# Patient Record
(demographics unavailable — no encounter records)

---

## 2025-02-18 NOTE — HEALTH RISK ASSESSMENT
[Yes] : Yes [2 - 4 times a month (2 pts)] : 2-4 times a month (2 points) [1 or 2 (0 pts)] : 1 or 2 (0 points) [Less than monthly (1 pt)] : Less than monthly (1 point) [0] : 1) Little interest or pleasure doing things: Not at all (0) [1] : 2) Feeling down, depressed, or hopeless for several days (1) [PHQ-2 Negative - No further assessment needed] : PHQ-2 Negative - No further assessment needed [Never] : Never [HIV Test offered] : HIV Test offered [Hepatitis C test declined] : Hepatitis C test declined [Sexually Active] : sexually active [Audit-CScore] : 3 [de-identified] : not as much as I should - works on his feet [de-identified] : lot of red meat - eats more processed sugars than he should - eating enough vegetables  [[id number]] : 1 [de-identified] : former marijuana smoking [High Risk Behavior] : no high risk behavior [HIVComments] : will screen today [HepatitisCComments] : hsa been screened in the pas [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

## 2025-02-18 NOTE — HISTORY OF PRESENT ILLNESS
[FreeTextEntry1] : CPE, foot pain [de-identified] : pain in r foot for a few months without specific injury  He works on his feet all day at a restaurant   has previously been on SSRI for depression/anxiety but has been stable off meds  history of deviated septum which he notes causes him some discomfort at times

## 2025-02-18 NOTE — PHYSICAL EXAM
[Normal Sclera/Conjunctiva] : normal sclera/conjunctiva [EOMI] : extraocular movements intact [Normal] : normal rate, regular rhythm, normal S1 and S2 and no murmur heard [No Varicosities] : no varicosities [No Edema] : there was no peripheral edema [Soft] : abdomen soft [Non Tender] : non-tender [Non-distended] : non-distended [No Masses] : no abdominal mass palpated [No HSM] : no HSM [Normal Posterior Cervical Nodes] : no posterior cervical lymphadenopathy [Normal Anterior Cervical Nodes] : no anterior cervical lymphadenopathy [No Joint Swelling] : no joint swelling [Grossly Normal Strength/Tone] : grossly normal strength/tone [Coordination Grossly Intact] : coordination grossly intact [No Focal Deficits] : no focal deficits [Normal Gait] : normal gait [Normal Affect] : the affect was normal [Normal Insight/Judgement] : insight and judgment were intact [de-identified] : point tenderness distal to the R calcaneus; excellent ROM, strength 5/5

## 2025-03-07 NOTE — ASSESSMENT
[FreeTextEntry1] : We discussed allergen mitigation and provided the patient with the corresponding educational handout; reviewed proper nasal steroid administration technique. We discussed a likely septo/turbs +/- PNN ablation  RTC for an ear cleaning in 6 months, sooner prn any ongoing sxs or changes.

## 2025-03-07 NOTE — PROCEDURE
[de-identified] : Indication: requirement for exam not possible via anterior rhinoscopy; chronic nasal obstruction After verbal consent and the administration of an aerosolized oxymetazoline/lidocaine mix, examination was performed with a flexible endoscope attached to a video monitoring system. Findings: Septum: undulating Mucosa: normal Polyposis: not present Inferior turbinates: large, pos Afrin test Middle and superior turbinates: normal Inferior meatus: unremarkable Middle meatus: unremarkable Superior meatus: unremarkable Speno-ethmoidal recess: unremarkable Nasopharynx: unremarkable Secretions: unremarkable Other findings: none [de-identified] : Indication: requirement for exam not possible via anterior examination; globus After verbal consent and the administration of an aerosolized oxymetazoline/lidocaine mix, examination was performed with a flexible endoscope placed through the nose which was attached to a video monitoring system. Findings: Nasopharynx: unremarkable Soft palate, lateral and posterior pharyngeal walls: unremarkable Base of tongue & lingual tonsil: minimal retrodisplacement Vallecula: unremarkable Epiglottis: unremarkable Piriform sinuses and pharyngoesophageal junction: unremarkable Arytenoids and AE folds: mild interarytenoid edema Ventricle and false vocal folds: unremarkable True vocal folds: Smooth free edge; surface without ectasias or edema; normal movement bilaterally with good apposition in phonation Visible subglottis: unremarkable Narrow-band imaging: not used Other findings: none

## 2025-03-07 NOTE — CONSULT LETTER
[Dear  ___] : Dear  [unfilled], [Consult Letter:] : I had the pleasure of evaluating your patient, [unfilled]. [Please see my note below.] : Please see my note below. [Consult Closing:] : Thank you very much for allowing me to participate in the care of this patient.  If you have any questions, please do not hesitate to contact me. [Sincerely,] : Sincerely, [FreeTextEntry3] : MARISEL Monson Jr, MD, FAAOHNS Otolaryngologist [facility]

## 2025-03-07 NOTE — PHYSICAL EXAM
[Binocular Microscopic Exam] : Binocular microscopic exam was performed [FreeTextEntry8] : Obstructing cerumen was removed with a hook [FreeTextEntry9] : Obstructing cerumen was removed with a hook [Nasal Endoscopy Performed] : nasal endoscopy was performed, see procedure section for findings [] : septum deviated to the left [de-identified] :  Large inferior turbinates; positive Afrin test [Normal] : no masses and lesions seen, face is symmetric

## 2025-03-07 NOTE — HISTORY OF PRESENT ILLNESS
[de-identified] : Chronically runny nose w/ clear to yellow discharge. Alternating nasal obstruction. Cat, pollen, dust and food allergies; antihistamines and nasal steroids haven't really helped. Not a lot of sinus infections.  Chronic PND sensation with some clearing; he denies cough, reflux or hoarseness.  No qtip use; his hearing is ok. No tinnitus.

## 2025-05-27 NOTE — HISTORY OF PRESENT ILLNESS
[de-identified] : Chronically runny nose w/ clear to yellow discharge; this is unchanged, as is chronic nasal obstruction after a month of fluticasone. Cat, pollen, dust and food allergies. Not a lot of sinus infections. He now presents to preop for a Rhinaer Chronic PND sensation with some clearing; he denies cough, reflux or hoarseness.  No qtip use; his hearing is ok. No tinnitus.

## 2025-05-27 NOTE — PHYSICAL EXAM
[] : septum deviated to the left [de-identified] :  Large inferior turbinates [de-identified] : Bilaterally narrowed internal nasal valves with a positive modified Josse maneuver bilaterally [Normal] : the left external ear was normal

## 2025-05-27 NOTE — ASSESSMENT
[FreeTextEntry1] : As per the patient's request, the informed consent was obtained ahead of time to allow the pre-administration of medication on the day of surgery. We fully discussed perioperative care and the risks and benefits of bilateral inferior turbinoplasty, septal turbinate ablation and lateral nasal wall stiffening using the Vivaer device including but not limited to cosmetic changes, the need for more surgery, nosebleed, loss of sense of smell, poor scarring, infection, septal perforation and prolonged crusting. The patient expressed understanding and signed informed consent. An educational perioperative care handout was given. [facility] Reference #: [id number]

## 2025-06-06 NOTE — PROCEDURE
[FreeTextEntry1] : Vivaer procedure (bilateral inferior turbinoplasty, septal turbinate ablation and lateral nasal wall stiffening) [FreeTextEntry2] : turbinate hypertrophy, nasal valve stenosis [FreeTextEntry3] : Proper informed consent had been previously obtained after reviewing the risks and benefits. As per the patient's wishes, pre-procedural oral medication had been administered and was taken by the patient a sufficient amount of time prior to the procedure.  A time-out was performed confirming the presence of the consent scanned into the EMR. Aerosolized intranasal oxymetazoline and lidocaine was administered and then pledgets soaked in the same solution were placed in the nose. The patient was taz supine and a headlight was used to monitor the placement of 4% lidocaine-covered pledgets in the areas of interest. After sufficient time had elapsed for the anesthesia to take effect, the pledgets were removed and, using a rigid endoscope attached to a video monitor, the EpiEP device was used to perform transmucosal intramural ablation of the entire left inferior turbinate. Attention was turned to the left septal turbinate which was similarly ablated. The device was then used in three locations while retracting the upper lateral cartilage/scroll area laterally with good effect. This sequence of inferior turbinoplasty, septal turbinate ablation and lateral nasal wall stiffening was then repeated on the right side. The nose and nasopharynx were suctioned. The patient was allowed to relax in the procedure room for a period of time. The procedure was tolerated well, and there were no complications. Postop care was reviewed. Lot #: [id number] Exp: 05/26

## 2025-07-08 NOTE — ASSESSMENT
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 RTC prn

## 2025-07-08 NOTE — HISTORY OF PRESENT ILLNESS
[de-identified] : s/p VIvaer 6/6/25 & returns with significant improvement in his breathing from preop.